# Patient Record
Sex: FEMALE | Race: ASIAN | NOT HISPANIC OR LATINO | URBAN - METROPOLITAN AREA
[De-identification: names, ages, dates, MRNs, and addresses within clinical notes are randomized per-mention and may not be internally consistent; named-entity substitution may affect disease eponyms.]

---

## 2019-09-29 ENCOUNTER — EMERGENCY (EMERGENCY)
Facility: HOSPITAL | Age: 49
LOS: 1 days | Discharge: HOME | End: 2019-09-29
Attending: STUDENT IN AN ORGANIZED HEALTH CARE EDUCATION/TRAINING PROGRAM | Admitting: STUDENT IN AN ORGANIZED HEALTH CARE EDUCATION/TRAINING PROGRAM
Payer: COMMERCIAL

## 2019-09-29 VITALS
DIASTOLIC BLOOD PRESSURE: 94 MMHG | SYSTOLIC BLOOD PRESSURE: 149 MMHG | RESPIRATION RATE: 16 BRPM | TEMPERATURE: 99 F | HEART RATE: 90 BPM | OXYGEN SATURATION: 100 %

## 2019-09-29 PROCEDURE — 99284 EMERGENCY DEPT VISIT MOD MDM: CPT | Mod: 25

## 2019-09-29 PROCEDURE — 93010 ELECTROCARDIOGRAM REPORT: CPT | Mod: 59

## 2019-09-29 NOTE — ED ADULT TRIAGE NOTE - CHIEF COMPLAINT QUOTE
C/o pain to back of neck radiating up to head since yesterday. As per family pt had  for father yesterday and "passed out a few times at the  parlor". Also endorses dizziness. H/o HTN.

## 2019-09-30 VITALS
TEMPERATURE: 98 F | DIASTOLIC BLOOD PRESSURE: 87 MMHG | RESPIRATION RATE: 18 BRPM | HEART RATE: 91 BPM | OXYGEN SATURATION: 100 % | SYSTOLIC BLOOD PRESSURE: 137 MMHG

## 2019-09-30 LAB
ALBUMIN SERPL ELPH-MCNC: 4.1 G/DL — SIGNIFICANT CHANGE UP (ref 3.3–5)
ALP SERPL-CCNC: 67 U/L — SIGNIFICANT CHANGE UP (ref 40–120)
ALT FLD-CCNC: 18 U/L — SIGNIFICANT CHANGE UP (ref 4–33)
ANION GAP SERPL CALC-SCNC: 11 MMO/L — SIGNIFICANT CHANGE UP (ref 7–14)
AST SERPL-CCNC: 17 U/L — SIGNIFICANT CHANGE UP (ref 4–32)
BILIRUB SERPL-MCNC: < 0.2 MG/DL — LOW (ref 0.2–1.2)
BUN SERPL-MCNC: 11 MG/DL — SIGNIFICANT CHANGE UP (ref 7–23)
CALCIUM SERPL-MCNC: 9.1 MG/DL — SIGNIFICANT CHANGE UP (ref 8.4–10.5)
CHLORIDE SERPL-SCNC: 107 MMOL/L — SIGNIFICANT CHANGE UP (ref 98–107)
CO2 SERPL-SCNC: 22 MMOL/L — SIGNIFICANT CHANGE UP (ref 22–31)
CREAT SERPL-MCNC: 0.66 MG/DL — SIGNIFICANT CHANGE UP (ref 0.5–1.3)
GLUCOSE SERPL-MCNC: 104 MG/DL — HIGH (ref 70–99)
HCT VFR BLD CALC: 38.1 % — SIGNIFICANT CHANGE UP (ref 34.5–45)
HGB BLD-MCNC: 12.1 G/DL — SIGNIFICANT CHANGE UP (ref 11.5–15.5)
MAGNESIUM SERPL-MCNC: 2.1 MG/DL — SIGNIFICANT CHANGE UP (ref 1.6–2.6)
MCHC RBC-ENTMCNC: 26.5 PG — LOW (ref 27–34)
MCHC RBC-ENTMCNC: 31.8 % — LOW (ref 32–36)
MCV RBC AUTO: 83.6 FL — SIGNIFICANT CHANGE UP (ref 80–100)
NRBC # FLD: 0 K/UL — SIGNIFICANT CHANGE UP (ref 0–0)
PLATELET # BLD AUTO: 296 K/UL — SIGNIFICANT CHANGE UP (ref 150–400)
PMV BLD: 10 FL — SIGNIFICANT CHANGE UP (ref 7–13)
POTASSIUM SERPL-MCNC: 4.3 MMOL/L — SIGNIFICANT CHANGE UP (ref 3.5–5.3)
POTASSIUM SERPL-SCNC: 4.3 MMOL/L — SIGNIFICANT CHANGE UP (ref 3.5–5.3)
PROT SERPL-MCNC: 7.3 G/DL — SIGNIFICANT CHANGE UP (ref 6–8.3)
RBC # BLD: 4.56 M/UL — SIGNIFICANT CHANGE UP (ref 3.8–5.2)
RBC # FLD: 13.7 % — SIGNIFICANT CHANGE UP (ref 10.3–14.5)
SODIUM SERPL-SCNC: 140 MMOL/L — SIGNIFICANT CHANGE UP (ref 135–145)
WBC # BLD: 7.99 K/UL — SIGNIFICANT CHANGE UP (ref 3.8–10.5)
WBC # FLD AUTO: 7.99 K/UL — SIGNIFICANT CHANGE UP (ref 3.8–10.5)

## 2019-09-30 RX ORDER — LIDOCAINE 4 G/100G
1 CREAM TOPICAL ONCE
Refills: 0 | Status: COMPLETED | OUTPATIENT
Start: 2019-09-30 | End: 2019-09-30

## 2019-09-30 RX ORDER — ACETAMINOPHEN 500 MG
650 TABLET ORAL ONCE
Refills: 0 | Status: COMPLETED | OUTPATIENT
Start: 2019-09-30 | End: 2019-09-30

## 2019-09-30 RX ORDER — KETOROLAC TROMETHAMINE 30 MG/ML
15 SYRINGE (ML) INJECTION ONCE
Refills: 0 | Status: DISCONTINUED | OUTPATIENT
Start: 2019-09-30 | End: 2019-09-30

## 2019-09-30 RX ORDER — DIAZEPAM 5 MG
5 TABLET ORAL ONCE
Refills: 0 | Status: DISCONTINUED | OUTPATIENT
Start: 2019-09-30 | End: 2019-09-30

## 2019-09-30 RX ORDER — SODIUM CHLORIDE 9 MG/ML
1000 INJECTION INTRAMUSCULAR; INTRAVENOUS; SUBCUTANEOUS ONCE
Refills: 0 | Status: COMPLETED | OUTPATIENT
Start: 2019-09-30 | End: 2019-09-30

## 2019-09-30 RX ADMIN — SODIUM CHLORIDE 1000 MILLILITER(S): 9 INJECTION INTRAMUSCULAR; INTRAVENOUS; SUBCUTANEOUS at 01:03

## 2019-09-30 RX ADMIN — Medication 650 MILLIGRAM(S): at 01:03

## 2019-09-30 RX ADMIN — Medication 15 MILLIGRAM(S): at 01:03

## 2019-09-30 RX ADMIN — Medication 5 MILLIGRAM(S): at 01:03

## 2019-09-30 RX ADMIN — LIDOCAINE 1 PATCH: 4 CREAM TOPICAL at 01:03

## 2019-09-30 NOTE — ED PROVIDER NOTE - CLINICAL SUMMARY MEDICAL DECISION MAKING FREE TEXT BOX
50yo p/w neck pain and lightheadedness for 2 days after fathers .  - 1L NS bolus  - CBC/CMP  - Tylenol/Toradol/Lidocaine Patch and Valium

## 2019-09-30 NOTE — ED PROVIDER NOTE - PROGRESS NOTE DETAILS
Luda Wheeler M.D: pt reassessed, feeling better, stable for dc at this time with outpatient follow up.

## 2019-09-30 NOTE — ED PROVIDER NOTE - OBJECTIVE STATEMENT
48yo w/ PMHx of HTN c/o headache/neck pain that started yesterday during her fathers . Observers reported she was dizzy/lightheaded but did not faint. She states her neck pain has since got worse, even after taking advil and Excedrin. She also c/o sensitivity to light. Denies N/V, CP, SOB, Abdominal pain, constipation or diarrhea.

## 2019-09-30 NOTE — ED PROVIDER NOTE - PLAN OF CARE
Get Better with medication and stress relief - f/u outpatient with PCP  - Continue with OTC pain medication

## 2019-09-30 NOTE — ED PROVIDER NOTE - NSFOLLOWUPINSTRUCTIONS_ED_ALL_ED_FT
Patient informed to followup with her primary care physician outpatient for long term care of tension headaches and help assist with the lost of her father. If patient has worsening headaches or neck pain that is not resolved with OTC medications, she can contact her PCP or come to the emergency room if needed.

## 2019-09-30 NOTE — ED PROVIDER NOTE - ATTENDING CONTRIBUTION TO CARE
Luda Wheeler M.D: 49F hx HTN p/w 2 days of headache and posterior neck pain with associated dizziness. symptos started yesterday during  of her father and have been worsening despite advil, excedrin. notes headache is aching/posterior, feels similar to typical headaches. neck pain is diffuse throughout posterior neck, sharp, worse with movement. no numbness/tingling weakness in extremities. also notes dizziness described as lightheadedness, near syncope. no cp no sob no n/v no abd pain no difficulty walking. on exam pt appaers in no distress, moving neck briskly while talking. heart rrr, lungs ctab, abd soft ntnd, cn 2-12 intact gross motor and sensory intact in all extremities, ttp in bl parapsinal cervical region with palpable muscle spasm.   neck pain/headache seems most consistent with MSK pain likely exacerbated by stress of . no neuro findings to suggest cervical cord pathology, vertebral artery pathology. full ROM in neck and no f/c make meningitis unlikely. dizziness/near syncope likely also stress related, ekg without findings to suggest cardiac etiology (no interval prolongation no signs of brugada, hocm, ). will check basic labs to assess for any anemia or lyte abnormality as contributor to symptoms, will treat pain/spasm and reassess.

## 2019-09-30 NOTE — ED PROVIDER NOTE - PHYSICAL EXAMINATION
GEN: NAD; well appearing; A+O x3   HEAD: NC/AT   EYES/NOSE: PERRL & EOMI b/l  THROAT: airway patent, moist mucous membranes  NECK: Neck supple, no masses  PULMONARY: CTA b/l, symmetric breath sounds.   CARDIAC: s1s2, regular rhythm, no Murmur  ABDOMEN:  ND, NT, soft, no guarding, no rebound, no masses   BACK: no CVA tenderness, Normal  spine   SKIN: no rash or bruising   NEUROLOGIC: alert, CN 2-12 intact, . moves all 4 extremities, full active & passive ROM in all extremities,  PSYCH: depressed affect, insight and judgment nl, memory nl,

## 2019-09-30 NOTE — ED PROVIDER NOTE - PATIENT PORTAL LINK FT
You can access the FollowMyHealth Patient Portal offered by [facility] by registering at the following website: [email or web address]. By joining [employer]’s FollowMyHealth portal, you will also be able to view your health information using other applications (apps) compatible with our system.

## 2019-09-30 NOTE — ED PROVIDER NOTE - CARE PLAN
Principal Discharge DX:	Tension headache  Goal:	Get Better with medication and stress relief  Assessment and plan of treatment:	- f/u outpatient with PCP  - Continue with OTC pain medication

## 2019-09-30 NOTE — ED PROVIDER NOTE - NS ED ROS FT
GENERAL: no fever, chills  HEENT: no throat pain, cough, congestion, dysphagia  CARDIAC: no chest pain, palpitations  PULM: no shortness of breath, cough, wheezing   GI: no abdominal pain, nausea, vomiting, diarrhea, constipation  : no dysuria, frequency  NEURO: headache, no lightheadedness. Neck pain with movement  MSK: no joint or muscle pain  SKIN: no rashes  HEME: no active bleeding

## 2019-09-30 NOTE — ED ADULT NURSE NOTE - OBJECTIVE STATEMENT
50 yo F AAOx4 received to room 17 c/o HA/neck pain that started yesterday, denies CP SOB blurry vision N V, was at a  yesterday and had a syncopal episode, no signs of trauma/injuries, appears in NADKIARA MD at bedside, will monitor

## 2020-04-09 NOTE — ED ADULT NURSE NOTE - CAS EDN DISCHARGE ASSESSMENT
Clinic Care Coordination Contact  [facility]/Voicemail       Clinical Data: Care Coordinator Outreach  Outreach attempted x 2.  Left message on patient's voicemail with call back information and requested return call.  Plan: Care Coordinator will try to reach patient again in approximately 10 business days.    Melissa Behl BSN, RN, PHN, [facility]  Primary Care Clinical RN Care Coordinator  [facility]   [phone]       
Alert and oriented to person, place and time

## 2021-05-06 ENCOUNTER — EMERGENCY (EMERGENCY)
Facility: HOSPITAL | Age: 51
LOS: 1 days | Discharge: HOME | End: 2021-05-06
Attending: EMERGENCY MEDICINE | Admitting: EMERGENCY MEDICINE
Payer: COMMERCIAL

## 2021-05-06 VITALS
RESPIRATION RATE: 20 BRPM | TEMPERATURE: 99 F | DIASTOLIC BLOOD PRESSURE: 90 MMHG | HEART RATE: 114 BPM | SYSTOLIC BLOOD PRESSURE: 148 MMHG | OXYGEN SATURATION: 100 %

## 2021-05-06 VITALS — HEART RATE: 96 BPM

## 2021-05-06 PROBLEM — I10 ESSENTIAL (PRIMARY) HYPERTENSION: Chronic | Status: ACTIVE | Noted: 2019-09-30

## 2021-05-06 LAB — SARS-COV-2 RNA SPEC QL NAA+PROBE: SIGNIFICANT CHANGE UP

## 2021-05-06 PROCEDURE — 99284 EMERGENCY DEPT VISIT MOD MDM: CPT

## 2021-05-06 PROCEDURE — 71045 X-RAY EXAM CHEST 1 VIEW: CPT | Mod: 26

## 2021-05-06 RX ORDER — IBUPROFEN 200 MG
1 TABLET ORAL
Qty: 28 | Refills: 0
Start: 2021-05-06 | End: 2021-05-12

## 2021-05-06 RX ORDER — IBUPROFEN 200 MG
600 TABLET ORAL ONCE
Refills: 0 | Status: COMPLETED | OUTPATIENT
Start: 2021-05-06 | End: 2021-05-06

## 2021-05-06 RX ORDER — ACETAMINOPHEN 500 MG
650 TABLET ORAL ONCE
Refills: 0 | Status: COMPLETED | OUTPATIENT
Start: 2021-05-06 | End: 2021-05-06

## 2021-05-06 RX ORDER — ACETAMINOPHEN 500 MG
1 TABLET ORAL
Qty: 28 | Refills: 0
Start: 2021-05-06 | End: 2021-05-12

## 2021-05-06 RX ADMIN — Medication 600 MILLIGRAM(S): at 12:16

## 2021-05-06 RX ADMIN — Medication 650 MILLIGRAM(S): at 12:16

## 2021-05-06 NOTE — ED PROVIDER NOTE - NSFOLLOWUPINSTRUCTIONS_ED_ALL_ED_FT
Follow up with your PMD within 48-72 hours.  Rest, increase fluids. Take tylenol 650mg every 6 hours for pain or temp greater than 99.9. Take Motrin 600 mg every 8 hours as needed for moderate pain -- take with food. Take Mucinex as needed for congestion. Worsening or continued fever, chills, weakness, nausea, vomiting, abdominal pain or new concerning symptoms return to Emergency Department.

## 2021-05-06 NOTE — ED PROVIDER NOTE - CLINICAL SUMMARY MEDICAL DECISION MAKING FREE TEXT BOX
52 y/o F with PMHx of HTN presents to ED c/o fever, cough, and congestion for 2 days.  Recent travel from [place] with URI symptoms. COVID vs other viral syndrome. Will obtain COVID swab, CXR, give Ibuprofen. D/c with PCP f/u. 52 y/o F with PMHx of HTN presents to ED c/o fever, cough, and congestion for 2 days.  Recent travel from [place] with URI symptoms. COVID vs other viral syndrome, less likely bact pna. Will obtain COVID swab, CXR, give Ibuprofen, acetaminophen. D/c with PCP f/u.

## 2021-05-06 NOTE — ED ADULT TRIAGE NOTE - CHIEF COMPLAINT QUOTE
Pt c/o cough, fever, congestion, SOB since yesterday. States she returned from [place] 4 days ago. Hx: HTN. Respirations even/unlabored at this time

## 2021-05-06 NOTE — ED PROVIDER NOTE - PATIENT PORTAL LINK FT
You can access the FollowMyHealth Patient Portal offered by [facility] by registering at the following website: [email or web address]. By joining [employer]’s FollowMyHealth portal, you will also be able to view your health information using other applications (apps) compatible with our system.

## 2021-05-06 NOTE — ED ADULT TRIAGE NOTE - DOMESTIC TRAVEL HIGH RISK QUESTION
POSTPARTUM FOLLOW UP CALL    Date of delivery: 12.5.2020 , Magy fournier , Dr. Thaddeus Wolf     Are you breastfeeding? No    What type of delivery did you have? Vaginal - Patient is experiencing the following symptoms:  No concerns expressed    Are you having any pain or discomfort? No    Are you getting enough support at home? yes    How much rest are you getting? Doing ok. Were you gestational diabetic during your pregnancy? No    Have you experienced any symptoms of postpartum depression? -- Feeling anxious or worried -No - patient educated on signs/symptoms of postpartum depression and instructed to call if symptoms continue for more than 2 weeks. Patient denies any feelings of harming herself or her infant. -- Feeling sad -No - patient educated on signs/symptoms of postpartum depression and instructed to call if symptoms continue for more than 2 weeks. Patient denies any feelings of harming herself or her infant. -- Feeling isolated -No - patient educated on signs/symptoms of postpartum depression and instructed to call if symptoms continue for more than 2 weeks. Patient denies any feelings of harming herself or her infant. Patient was instructed to:   -- avoid sexual intercourse for 6 weeks after delivery. -- avoid lifting anything heavier than the baby for at least the first 6 weeks after delivery. -- call the office if:       vaginal discharge increases or requires changing sanitary pads more than once an hour. fever or chills. dizziness. nausea or vomiting. shortness of breath.       pain and burning during urination. painful red breasts. Have you scheduled your postpartum checkup? no - appointment time booked     Patient transferred to ob reception for appt. Education reviewed about bleeding, sex, urinary problems, lifting, limitations, breast issues, fever, chills, shortness of breath. Patient understands to call our office if any occur. Yes

## 2022-07-05 NOTE — ED PROVIDER NOTE - CROS ED ENMT ALL NEG
Call to [employer]. She states letter was faxed to the wrong department. It is not a PA.   It needs to go to Claims Appeal.   [phone]    refaxed the letter.       
Patient calling regarding her vitamin D test and the letter written by Chantale at office visit on 5/2/22. She stated the insurance company did not get the letter that was written at office visit.  See letter from 6/3/22.    [facility].  Call [phone]    Please call insurance company next week due to time constraints could not do it today.   
- - -

## 2023-04-28 ENCOUNTER — EMERGENCY (EMERGENCY)
Facility: HOSPITAL | Age: 53
LOS: 1 days | Discharge: HOME | End: 2023-04-28
Attending: STUDENT IN AN ORGANIZED HEALTH CARE EDUCATION/TRAINING PROGRAM | Admitting: STUDENT IN AN ORGANIZED HEALTH CARE EDUCATION/TRAINING PROGRAM
Payer: COMMERCIAL

## 2023-04-28 VITALS
SYSTOLIC BLOOD PRESSURE: 164 MMHG | HEART RATE: 122 BPM | TEMPERATURE: 100 F | RESPIRATION RATE: 16 BRPM | OXYGEN SATURATION: 98 % | DIASTOLIC BLOOD PRESSURE: 100 MMHG

## 2023-04-28 PROCEDURE — 99285 EMERGENCY DEPT VISIT HI MDM: CPT

## 2023-04-28 PROCEDURE — 93010 ELECTROCARDIOGRAM REPORT: CPT

## 2023-04-29 VITALS — HEART RATE: 104 BPM

## 2023-04-29 LAB
ALBUMIN SERPL ELPH-MCNC: 3.7 G/DL — SIGNIFICANT CHANGE UP (ref 3.3–5)
ALP SERPL-CCNC: 105 U/L — SIGNIFICANT CHANGE UP (ref 40–120)
ALT FLD-CCNC: 36 U/L — HIGH (ref 4–33)
ANION GAP SERPL CALC-SCNC: 13 MMOL/L — SIGNIFICANT CHANGE UP (ref 7–14)
AST SERPL-CCNC: 47 U/L — HIGH (ref 4–32)
B PERT DNA SPEC QL NAA+PROBE: SIGNIFICANT CHANGE UP
B PERT+PARAPERT DNA PNL SPEC NAA+PROBE: SIGNIFICANT CHANGE UP
BASOPHILS # BLD AUTO: 0.04 K/UL — SIGNIFICANT CHANGE UP (ref 0–0.2)
BASOPHILS NFR BLD AUTO: 0.5 % — SIGNIFICANT CHANGE UP (ref 0–2)
BILIRUB SERPL-MCNC: <0.2 MG/DL — SIGNIFICANT CHANGE UP (ref 0.2–1.2)
BORDETELLA PARAPERTUSSIS (RAPRVP): SIGNIFICANT CHANGE UP
BUN SERPL-MCNC: 13 MG/DL — SIGNIFICANT CHANGE UP (ref 7–23)
C PNEUM DNA SPEC QL NAA+PROBE: SIGNIFICANT CHANGE UP
CALCIUM SERPL-MCNC: 8.5 MG/DL — SIGNIFICANT CHANGE UP (ref 8.4–10.5)
CHLORIDE SERPL-SCNC: 105 MMOL/L — SIGNIFICANT CHANGE UP (ref 98–107)
CO2 SERPL-SCNC: 21 MMOL/L — LOW (ref 22–31)
CREAT SERPL-MCNC: 0.67 MG/DL — SIGNIFICANT CHANGE UP (ref 0.5–1.3)
EGFR: 105 ML/MIN/1.73M2 — SIGNIFICANT CHANGE UP
EOSINOPHIL # BLD AUTO: 0.04 K/UL — SIGNIFICANT CHANGE UP (ref 0–0.5)
EOSINOPHIL NFR BLD AUTO: 0.5 % — SIGNIFICANT CHANGE UP (ref 0–6)
FLUAV SUBTYP SPEC NAA+PROBE: SIGNIFICANT CHANGE UP
FLUBV RNA SPEC QL NAA+PROBE: SIGNIFICANT CHANGE UP
GLUCOSE SERPL-MCNC: 113 MG/DL — HIGH (ref 70–99)
HADV DNA SPEC QL NAA+PROBE: SIGNIFICANT CHANGE UP
HCOV 229E RNA SPEC QL NAA+PROBE: SIGNIFICANT CHANGE UP
HCOV HKU1 RNA SPEC QL NAA+PROBE: SIGNIFICANT CHANGE UP
HCOV NL63 RNA SPEC QL NAA+PROBE: SIGNIFICANT CHANGE UP
HCOV OC43 RNA SPEC QL NAA+PROBE: SIGNIFICANT CHANGE UP
HCT VFR BLD CALC: 33.5 % — LOW (ref 34.5–45)
HGB BLD-MCNC: 10.5 G/DL — LOW (ref 11.5–15.5)
HMPV RNA SPEC QL NAA+PROBE: DETECTED
HPIV1 RNA SPEC QL NAA+PROBE: SIGNIFICANT CHANGE UP
HPIV2 RNA SPEC QL NAA+PROBE: SIGNIFICANT CHANGE UP
HPIV3 RNA SPEC QL NAA+PROBE: SIGNIFICANT CHANGE UP
HPIV4 RNA SPEC QL NAA+PROBE: SIGNIFICANT CHANGE UP
IANC: 5.72 K/UL — SIGNIFICANT CHANGE UP (ref 1.8–7.4)
IMM GRANULOCYTES NFR BLD AUTO: 0.2 % — SIGNIFICANT CHANGE UP (ref 0–0.9)
LYMPHOCYTES # BLD AUTO: 1.49 K/UL — SIGNIFICANT CHANGE UP (ref 1–3.3)
LYMPHOCYTES # BLD AUTO: 17.9 % — SIGNIFICANT CHANGE UP (ref 13–44)
M PNEUMO DNA SPEC QL NAA+PROBE: SIGNIFICANT CHANGE UP
MCHC RBC-ENTMCNC: 25.4 PG — LOW (ref 27–34)
MCHC RBC-ENTMCNC: 31.3 GM/DL — LOW (ref 32–36)
MCV RBC AUTO: 81.1 FL — SIGNIFICANT CHANGE UP (ref 80–100)
MONOCYTES # BLD AUTO: 1.02 K/UL — HIGH (ref 0–0.9)
MONOCYTES NFR BLD AUTO: 12.2 % — SIGNIFICANT CHANGE UP (ref 2–14)
NEUTROPHILS # BLD AUTO: 5.72 K/UL — SIGNIFICANT CHANGE UP (ref 1.8–7.4)
NEUTROPHILS NFR BLD AUTO: 68.7 % — SIGNIFICANT CHANGE UP (ref 43–77)
NRBC # BLD: 0 /100 WBCS — SIGNIFICANT CHANGE UP (ref 0–0)
NRBC # FLD: 0 K/UL — SIGNIFICANT CHANGE UP (ref 0–0)
PLATELET # BLD AUTO: 259 K/UL — SIGNIFICANT CHANGE UP (ref 150–400)
POTASSIUM SERPL-MCNC: 4.3 MMOL/L — SIGNIFICANT CHANGE UP (ref 3.5–5.3)
POTASSIUM SERPL-SCNC: 4.3 MMOL/L — SIGNIFICANT CHANGE UP (ref 3.5–5.3)
PROT SERPL-MCNC: 6.8 G/DL — SIGNIFICANT CHANGE UP (ref 6–8.3)
RAPID RVP RESULT: DETECTED
RBC # BLD: 4.13 M/UL — SIGNIFICANT CHANGE UP (ref 3.8–5.2)
RBC # FLD: 14.3 % — SIGNIFICANT CHANGE UP (ref 10.3–14.5)
RSV RNA SPEC QL NAA+PROBE: SIGNIFICANT CHANGE UP
RV+EV RNA SPEC QL NAA+PROBE: SIGNIFICANT CHANGE UP
SARS-COV-2 RNA SPEC QL NAA+PROBE: SIGNIFICANT CHANGE UP
SODIUM SERPL-SCNC: 139 MMOL/L — SIGNIFICANT CHANGE UP (ref 135–145)
TROPONIN T, HIGH SENSITIVITY RESULT: <6 NG/L — SIGNIFICANT CHANGE UP
WBC # BLD: 8.33 K/UL — SIGNIFICANT CHANGE UP (ref 3.8–10.5)
WBC # FLD AUTO: 8.33 K/UL — SIGNIFICANT CHANGE UP (ref 3.8–10.5)

## 2023-04-29 PROCEDURE — 71046 X-RAY EXAM CHEST 2 VIEWS: CPT | Mod: 26

## 2023-04-29 RX ORDER — KETOROLAC TROMETHAMINE 30 MG/ML
15 SYRINGE (ML) INJECTION ONCE
Refills: 0 | Status: DISCONTINUED | OUTPATIENT
Start: 2023-04-29 | End: 2023-04-29

## 2023-04-29 RX ORDER — ACETAMINOPHEN 500 MG
975 TABLET ORAL ONCE
Refills: 0 | Status: COMPLETED | OUTPATIENT
Start: 2023-04-29 | End: 2023-04-29

## 2023-04-29 RX ORDER — SODIUM CHLORIDE 9 MG/ML
1000 INJECTION INTRAMUSCULAR; INTRAVENOUS; SUBCUTANEOUS ONCE
Refills: 0 | Status: COMPLETED | OUTPATIENT
Start: 2023-04-29 | End: 2023-04-29

## 2023-04-29 RX ADMIN — Medication 15 MILLIGRAM(S): at 01:56

## 2023-04-29 RX ADMIN — Medication 15 MILLIGRAM(S): at 02:37

## 2023-04-29 RX ADMIN — SODIUM CHLORIDE 1000 MILLILITER(S): 9 INJECTION INTRAMUSCULAR; INTRAVENOUS; SUBCUTANEOUS at 01:56

## 2023-04-29 RX ADMIN — Medication 975 MILLIGRAM(S): at 01:56

## 2023-04-29 RX ADMIN — Medication 975 MILLIGRAM(S): at 02:37

## 2023-04-29 NOTE — ED PROVIDER NOTE - PROGRESS NOTE DETAILS
Mari Washington MD, PGY-3: Improved symptoms of chest pain, improved vital signs, heart rate now 104, prior had been 120.  Explained test results to patient, DC ready.

## 2023-04-29 NOTE — ED PROVIDER NOTE - NSFOLLOWUPINSTRUCTIONS_ED_ALL_ED_FT
--take tylenol or motrin as needed for pain. Take tylenol 1000mg every 6 hours alternating with motrin 600 mg every 6 hours (take tylenol or motrin then three hours later take the other then three hours later take the first).  --today, the lab tests we did include basic blood tests, the imaging tests we did include chest xray. results significant for no bacterial pneumonia, normal cardiac enzymes, no signs of bacterial infection on labs. we have included these test results in your paperwork. please take them to your follow-up appointment  --given that you were in the ED today, we recommend a followup visit with your general doctor (primary care doctor) within 7 days.   --your diagnosis is: viral syndrome, costochondritis.   --return to the ED if Symptoms change in type or nature, if worsening shortness of breath, if passing out

## 2023-04-29 NOTE — ED ADULT NURSE NOTE - ISOLATION TYPE:
-- DO NOT REPLY / DO NOT REPLY ALL --  -- Message is from the [facility]--    COVID-19 Universal Screening: Negative    General Patient Message      Reason for Call: The patient mother is returning a call to Dr. Roque    Caller Information       Type Contact Phone    2020 12:12 PM Phone (Incoming) Anabella Moulton (Mother) [phone] (M)          Alternative phone number: n/a    Turnaround time given to caller:   \"This message will be sent to [state Provider's name]. The clinical team will fulfill your request as soon as they review your message.\"    
Spoke with mom, reschedule pt appt to see Dr Roque on a different day.  
None

## 2023-04-29 NOTE — ED PROVIDER NOTE - PATIENT PORTAL LINK FT
You can access the FollowMyHealth Patient Portal offered by [facility] by registering at the following website: [email or web address]. By joining [employer]’s FollowMyHealth portal, you will also be able to view your health information using other applications (apps) compatible with our system.

## 2023-04-29 NOTE — ED PROVIDER NOTE - CHIEF COMPLAINT
The patient is a 53y Female complaining of weakness. Moderate chronic risk given medication non-compliance

## 2023-04-29 NOTE — ED PROVIDER NOTE - ATTENDING CONTRIBUTION TO CARE
I have personally performed a face to face medical and diagnostic evaluation of the patient. I have discussed with and reviewed the Resident's note and agree with the History, ROS, Physical Exam and MDM unless otherwise indicated. A brief summary of my personal evaluation and impression can be found below.    53-year-old female with past medical history of hypertension, hyperlipidemia presenting with chief complaint of generalized body aches, sore throat, bilateral chest pain that started about 1 day ago.  No known sick contacts.  Denies any cough,  shortness of breath, abdominal pain, diaphoresis or nausea or vomiting.  Symptoms consistent with possible viral syndrome.  On exam, patient tachycardic and low-grade temp.  Lungs clear to auscultation bilaterally with severe tenderness to palpation of the anterior chest wall bilaterally.  Low suspicion for pneumonia, but given chest pain and infectious symptoms, will obtain x-ray to assess for focal consolidation, more likely viral illness.  Low suspicion for ACS causing the chest pain given description and exam.  But given risk factors will obtain labs to assess for  cardiac cause.  EKG without any ischemic changes, sinus tach.  Symptoms likely secondary from costochondritis.  Trial Toradol, Tylenol, fluids, reassess to dispo. Ilia Upton, ED Attending

## 2023-04-29 NOTE — ED PROVIDER NOTE - PHYSICAL EXAMINATION
Gen: WDWN, NAD  HEENT: EOMI, no nasal discharge, mucous membranes moist. + oropharynx with erythema, no exudates.   CV: fast rate, regular rhythm, 2+ radial pulses b/l  Chest: + reproducible chest wall ttp L chest wall  Resp: CTAB, no W/R/R, no accessory muscle use, no increased work of breathing  GI: Abdomen soft non-distended, NTTP  MSK: No open wounds, no bruising, no LE edema  Neuro: A&Ox4, following commands, moving all four extremities spontaneously  Psych: appropriate mood

## 2023-04-29 NOTE — ED ADULT NURSE NOTE - OBJECTIVE STATEMENT
Patient A&OX4 present to Ed with c/o elevated B/P took meds PTA, temperature of 102.5 at home starting today and chest pain on pain scale 8/10 sharp in sensation. Patient denies taking anything at home for fever , recently traveled to [place] came back monday night

## 2023-04-29 NOTE — ED ADULT NURSE REASSESSMENT NOTE - NS ED NURSE REASSESS COMMENT FT1
Break Coverage RN: Pt A&OX4, respirations equal and unlabored. Pt medicated as per EMR orders. Safety maintained. Plan to dispo home. IV removed. No acute distress noted.

## 2023-04-29 NOTE — ED PROVIDER NOTE - CLINICAL SUMMARY MEDICAL DECISION MAKING FREE TEXT BOX
Mari Washington MD, PGY-3: 52YO hx HTN, HLD, p/w L sided CP, cough, sore throat. vs: tachycardic, normotensive. suspect viral illness, also consider bacterial pna. given age and risk factors, will screen for acs w/ basic labs, trop, cxr.

## 2023-04-29 NOTE — ED PROVIDER NOTE - OBJECTIVE STATEMENT
52YO hx HTN, HLD, p/w L sided CP. worse with palpation. a/w dry cough, headache, fevers, sore throat. + sick contact of grandson. CP is intermittent. denies hx of cardiac disease. despite triage note, pt denies ear pain, hearing loss. also denies abdominal pain, n/v.

## 2023-09-30 ENCOUNTER — EMERGENCY (EMERGENCY)
Facility: HOSPITAL | Age: 53
LOS: 1 days | Discharge: HOME | End: 2023-09-30
Attending: STUDENT IN AN ORGANIZED HEALTH CARE EDUCATION/TRAINING PROGRAM | Admitting: STUDENT IN AN ORGANIZED HEALTH CARE EDUCATION/TRAINING PROGRAM
Payer: COMMERCIAL

## 2023-09-30 VITALS
SYSTOLIC BLOOD PRESSURE: 157 MMHG | RESPIRATION RATE: 17 BRPM | TEMPERATURE: 101 F | WEIGHT: 139.99 LBS | HEART RATE: 131 BPM | OXYGEN SATURATION: 100 % | DIASTOLIC BLOOD PRESSURE: 97 MMHG

## 2023-09-30 PROCEDURE — 99285 EMERGENCY DEPT VISIT HI MDM: CPT

## 2023-09-30 PROCEDURE — 93010 ELECTROCARDIOGRAM REPORT: CPT

## 2023-09-30 RX ORDER — ACETAMINOPHEN 500 MG
1000 TABLET ORAL ONCE
Refills: 0 | Status: COMPLETED | OUTPATIENT
Start: 2023-09-30 | End: 2023-09-30

## 2023-09-30 RX ORDER — SODIUM CHLORIDE 9 MG/ML
1000 INJECTION INTRAMUSCULAR; INTRAVENOUS; SUBCUTANEOUS ONCE
Refills: 0 | Status: COMPLETED | OUTPATIENT
Start: 2023-09-30 | End: 2023-09-30

## 2023-09-30 RX ADMIN — SODIUM CHLORIDE 1000 MILLILITER(S): 9 INJECTION INTRAMUSCULAR; INTRAVENOUS; SUBCUTANEOUS at 23:52

## 2023-09-30 RX ADMIN — Medication 400 MILLIGRAM(S): at 23:52

## 2023-09-30 NOTE — ED PROVIDER NOTE - PROGRESS NOTE DETAILS
Tess Lovelace MD PGY-2: patient feeling better, VS Improved s/p IVF, ofirmev. COVID positive on RVP. CXR clear, UA neg. Patient requesting Paxlovid. Will send Rx to preferred pharmacy in addition to zofran for n/v as needed. advised to hold atorvastatin during paxlovid course. return precautions discussed and patient dc home with  in good condition.

## 2023-09-30 NOTE — ED ADULT NURSE NOTE - NSFALLUNIVINTERV_ED_ALL_ED
Bed/Stretcher in lowest position, wheels locked, appropriate side rails in place/Call bell, personal items and telephone in reach/Instruct patient to call for assistance before getting out of bed/chair/stretcher/Non-slip footwear applied when patient is off stretcher/[place] to call system/Physically safe environment - no spills, clutter or unnecessary equipment/Purposeful proactive rounding/Room/bathroom lighting operational, light cord in reach

## 2023-09-30 NOTE — ED ADULT TRIAGE NOTE - CHIEF COMPLAINT QUOTE
Pt arrives to ED, seen at [facility] earlier today for HA which began at 04:00 and was told she has a sinus infection.  Pt c/o N/V with abd pain and unable to keep down the meds she was prescribed.  Pt is tachy in 130's in triage with fever of 100.7F.

## 2023-09-30 NOTE — ED PROVIDER NOTE - CLINICAL SUMMARY MEDICAL DECISION MAKING FREE TEXT BOX
53Y F PMH HTN, HLD presenting with n/v, body aches, headache x1 day. Tachy to 130s, febrile to 100.9F oral on arrival. Lungs clear. Appears ill. Suspect viral illness given known COVID exposure. will initiate infectious work up - labs, BCx, RVP, UA/UC, CXR, EKG, IVF bolus, antipyretics, reassess

## 2023-09-30 NOTE — ED ADULT NURSE NOTE - OBJECTIVE STATEMENT
Break RN: A&OX4, awake, and alert, ambulatory, Patient is coming to ED in regards to fever, n/v/d, and ABD pain. Patient states  symptoms started today around 4pm, pain on ABD is generalized. Patient states she was given amoxicillin and was told she had a sinus infection, has not been able to hold down any of her home medications. patient endorses home fevers of 100.7. h/o HTN, HLD. Sinus tach on tele, RA. awaiting orders, will continue to monitor.

## 2023-09-30 NOTE — ED PROVIDER NOTE - NSICDXPASTMEDICALHX_GEN_ALL_CORE_FT
PAST MEDICAL HISTORY:  Essential hypertension      PAST MEDICAL HISTORY:  Essential hypertension     Hyperlipidemia

## 2023-09-30 NOTE — ED ADULT TRIAGE NOTE - TEMPERATURE IN CELSIUS (DEGREES C)
Ongoing SW/CM Assessment/Plan of Care Note     Discharge Disposition: Home   Discharge Destination: (Not going to other HC Provider)  Discharge Location: Home [place]   Services: None   Pharmacy: [facility]   Baseline Level of Care: Independent   Transportation: Self/Spouse  PCP: Lisa Elizabeth MD   DME: None   Advanced Directives:  Not on file  COVID Test: COVID+ 9/13  IMM/MOON: NA  Important Phone Numbers: Hilda, spouse at [phone]    See SW/CM flowsheets for goals and other objective data.    Patient/Family discharge goal (s):  Goal #1: Home discharge arranged  Goal #2: Transportation arranged or issues addressed  Goal #3: Transportation arranged or issues addressed    PT Recommendation:          OT Recommendation:          SLP Recommendation:       Disposition:  Planned Discharge Destination: Home/apartment with Spouse/SO    Progress note:   1000- Collaborated with care team for OFT Rounds to review and discuss dc plan. Patient on day seven of Remdesivir and IV Decadron. Patient is proning well. Patient on 30L @ 70%. No therapy needs.     1048 - Call placed to patient to review and discuss dc plans. Patient sounded to be alert and oriented. Patient reports feeling better every day. He reports dc plan remains the same and he will return home with no needs, except pending O2 needs.   CM to continue to follow for dc needs for patient to return home with spouse. DESEAN TAVERA         38.2

## 2023-09-30 NOTE — ED PROVIDER NOTE - NSFOLLOWUPINSTRUCTIONS_ED_ALL_ED_FT
You were seen in the ER today for body aches and fever.    All of your blood work today was within normal limits.  You tested positive for the COVID virus today.    I sent a prescription for the antiviral medication called Paxlovid to your pharmacy.  You should take this 2 times per day for 5 days to help with your viral symptoms.  There is an interaction with your cholesterol medication atorvastatin and the Paxlovid.  You should not take your atorvastatin while you are on the Paxlovid and for 2 days after you stop taking the Paxlovid.    Follow-up with your primary doctor in the next 1 week to be reevaluated.    If you develop any new or worsening symptoms including chest pain, difficulty breathing, uncontrolled nausea or vomiting, or you are otherwise concerned, please come back to the ER right away to be reevaluated.

## 2023-09-30 NOTE — ED PROVIDER NOTE - PATIENT PORTAL LINK FT
You can access the FollowMyHealth Patient Portal offered by [facility] by registering at the following website: [email or web address]. By joining [employer]’s FollowMyHealth portal, you will also be able to view your health information using other applications (apps) compatible with our system.

## 2023-09-30 NOTE — ED PROVIDER NOTE - ATTENDING CONTRIBUTION TO CARE
Milind Ocasio DO:  patient seen and evaluated with the resident.  I was present for key portions of the History & Physical, and I agree with the Impression & Plan. 53 YOF, PMH HTN, HLD, PW fever.  Patient ports 1 day of symptoms, started this morning at 0400, has fever/chills.  Also has malaise, dry cough, headache, nausea, sinus pressure.  Denies recent travel.  Reports she is has positive sick contact, patient she works or has COVID.  Mother bedside provides collateral.  Patient arrives HDS, well-appearing, neurovascular intact.  Suspect COVID or viral syndrome.  Do not suspect  bacterial sinus infection, cavernous sinus thrombosis.  Do not SPECT intracranial infection.  Do not SPECT meningitis.  Labs, imaging, symptomatic control, reassess. Milind Ocasio DO:  patient seen and evaluated with the resident.  I was present for key portions of the History & Physical, and I agree with the Impression & Plan. 53 YOF, PMH HTN, HLD, PW fever.  Patient ports 1 day of symptoms, started this morning at 0400, has fever/chills.  Also has malaise, dry cough, headache, nausea, sinus pressure.  Denies recent travel.  Reports she is has positive sick contact, patient she works or has COVID.  Mother bedside provides collateral.  Patient arrives HDS, well-appearing, neurovascular intact.  Suspect COVID or viral syndrome.  Do not suspect  bacterial sinus infection, cavernous sinus thrombosis.  Do not SPECT intracranial infection.  Do not SPECT meningitis.  Labs, imaging, symptomatic control, reassess..

## 2023-09-30 NOTE — ED ADULT NURSE NOTE - CHIEF COMPLAINT QUOTE
Pt arrives to ED, seen at [facility] earlier today for HA which began at 04:00 and was told she has a sinus infection.  Pt c/o N/V with abd pain and unable to keep down the meds she was prescribed.  Pt is tachy in 130's in triage with fever of 100.7F.

## 2023-09-30 NOTE — ED PROVIDER NOTE - OBJECTIVE STATEMENT
53Y F PMH HTN presenting with fever, nausea/vomiting, and body aches x1 day. 53Y F PMH HTN presenting with fever, nausea/vomiting, headache and body aches x1 day. Unable to tolerate PO throughout day today. Went to  prior to arrival and was prescribed Augmentin for presumed sinusitis after negative rapid COVID test. States she took first dose but immediately vomited so presents to ED for further evaluation. 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 Vaccinated x3. Denies chest pain, SOB.

## 2023-10-01 VITALS
TEMPERATURE: 99 F | RESPIRATION RATE: 18 BRPM | DIASTOLIC BLOOD PRESSURE: 78 MMHG | HEART RATE: 101 BPM | SYSTOLIC BLOOD PRESSURE: 123 MMHG | OXYGEN SATURATION: 100 %

## 2023-10-01 LAB
ALBUMIN SERPL ELPH-MCNC: 4.6 G/DL — SIGNIFICANT CHANGE UP (ref 3.3–5)
ALP SERPL-CCNC: 145 U/L — HIGH (ref 40–120)
ALT FLD-CCNC: 73 U/L — HIGH (ref 4–33)
ANION GAP SERPL CALC-SCNC: 15 MMOL/L — HIGH (ref 7–14)
APPEARANCE UR: CLEAR — SIGNIFICANT CHANGE UP
AST SERPL-CCNC: 24 U/L — SIGNIFICANT CHANGE UP (ref 4–32)
B PERT DNA SPEC QL NAA+PROBE: SIGNIFICANT CHANGE UP
B PERT+PARAPERT DNA PNL SPEC NAA+PROBE: SIGNIFICANT CHANGE UP
BACTERIA # UR AUTO: NEGATIVE /HPF — SIGNIFICANT CHANGE UP
BASOPHILS # BLD AUTO: 0.05 K/UL — SIGNIFICANT CHANGE UP (ref 0–0.2)
BASOPHILS NFR BLD AUTO: 0.5 % — SIGNIFICANT CHANGE UP (ref 0–2)
BILIRUB SERPL-MCNC: 0.3 MG/DL — SIGNIFICANT CHANGE UP (ref 0.2–1.2)
BILIRUB UR-MCNC: NEGATIVE — SIGNIFICANT CHANGE UP
BLOOD GAS VENOUS COMPREHENSIVE RESULT: SIGNIFICANT CHANGE UP
BORDETELLA PARAPERTUSSIS (RAPRVP): SIGNIFICANT CHANGE UP
BUN SERPL-MCNC: 11 MG/DL — SIGNIFICANT CHANGE UP (ref 7–23)
C PNEUM DNA SPEC QL NAA+PROBE: SIGNIFICANT CHANGE UP
CALCIUM SERPL-MCNC: 9.5 MG/DL — SIGNIFICANT CHANGE UP (ref 8.4–10.5)
CAST: 1 /LPF — SIGNIFICANT CHANGE UP (ref 0–4)
CHLORIDE SERPL-SCNC: 96 MMOL/L — LOW (ref 98–107)
CO2 SERPL-SCNC: 25 MMOL/L — SIGNIFICANT CHANGE UP (ref 22–31)
COLOR SPEC: YELLOW — SIGNIFICANT CHANGE UP
CREAT SERPL-MCNC: 0.7 MG/DL — SIGNIFICANT CHANGE UP (ref 0.5–1.3)
DIFF PNL FLD: ABNORMAL
EGFR: 104 ML/MIN/1.73M2 — SIGNIFICANT CHANGE UP
EOSINOPHIL # BLD AUTO: 0.01 K/UL — SIGNIFICANT CHANGE UP (ref 0–0.5)
EOSINOPHIL NFR BLD AUTO: 0.1 % — SIGNIFICANT CHANGE UP (ref 0–6)
FLUAV SUBTYP SPEC NAA+PROBE: SIGNIFICANT CHANGE UP
FLUBV RNA SPEC QL NAA+PROBE: SIGNIFICANT CHANGE UP
GLUCOSE SERPL-MCNC: 119 MG/DL — HIGH (ref 70–99)
GLUCOSE UR QL: NEGATIVE MG/DL — SIGNIFICANT CHANGE UP
HADV DNA SPEC QL NAA+PROBE: SIGNIFICANT CHANGE UP
HCOV 229E RNA SPEC QL NAA+PROBE: SIGNIFICANT CHANGE UP
HCOV HKU1 RNA SPEC QL NAA+PROBE: SIGNIFICANT CHANGE UP
HCOV NL63 RNA SPEC QL NAA+PROBE: SIGNIFICANT CHANGE UP
HCOV OC43 RNA SPEC QL NAA+PROBE: SIGNIFICANT CHANGE UP
HCT VFR BLD CALC: 40.4 % — SIGNIFICANT CHANGE UP (ref 34.5–45)
HGB BLD-MCNC: 13 G/DL — SIGNIFICANT CHANGE UP (ref 11.5–15.5)
HMPV RNA SPEC QL NAA+PROBE: SIGNIFICANT CHANGE UP
HPIV1 RNA SPEC QL NAA+PROBE: SIGNIFICANT CHANGE UP
HPIV2 RNA SPEC QL NAA+PROBE: SIGNIFICANT CHANGE UP
HPIV3 RNA SPEC QL NAA+PROBE: SIGNIFICANT CHANGE UP
HPIV4 RNA SPEC QL NAA+PROBE: SIGNIFICANT CHANGE UP
IANC: 7.58 K/UL — HIGH (ref 1.8–7.4)
IMM GRANULOCYTES NFR BLD AUTO: 0.2 % — SIGNIFICANT CHANGE UP (ref 0–0.9)
KETONES UR-MCNC: NEGATIVE MG/DL — SIGNIFICANT CHANGE UP
LEUKOCYTE ESTERASE UR-ACNC: NEGATIVE — SIGNIFICANT CHANGE UP
LIDOCAIN IGE QN: 25 U/L — SIGNIFICANT CHANGE UP (ref 7–60)
LYMPHOCYTES # BLD AUTO: 0.83 K/UL — LOW (ref 1–3.3)
LYMPHOCYTES # BLD AUTO: 8.7 % — LOW (ref 13–44)
M PNEUMO DNA SPEC QL NAA+PROBE: SIGNIFICANT CHANGE UP
MCHC RBC-ENTMCNC: 25.7 PG — LOW (ref 27–34)
MCHC RBC-ENTMCNC: 32.2 GM/DL — SIGNIFICANT CHANGE UP (ref 32–36)
MCV RBC AUTO: 80 FL — SIGNIFICANT CHANGE UP (ref 80–100)
MONOCYTES # BLD AUTO: 1.03 K/UL — HIGH (ref 0–0.9)
MONOCYTES NFR BLD AUTO: 10.8 % — SIGNIFICANT CHANGE UP (ref 2–14)
NEUTROPHILS # BLD AUTO: 7.58 K/UL — HIGH (ref 1.8–7.4)
NEUTROPHILS NFR BLD AUTO: 79.7 % — HIGH (ref 43–77)
NITRITE UR-MCNC: NEGATIVE — SIGNIFICANT CHANGE UP
NRBC # BLD: 0 /100 WBCS — SIGNIFICANT CHANGE UP (ref 0–0)
NRBC # FLD: 0 K/UL — SIGNIFICANT CHANGE UP (ref 0–0)
PH UR: 7 — SIGNIFICANT CHANGE UP (ref 5–8)
PLATELET # BLD AUTO: 271 K/UL — SIGNIFICANT CHANGE UP (ref 150–400)
POTASSIUM SERPL-MCNC: 3.8 MMOL/L — SIGNIFICANT CHANGE UP (ref 3.5–5.3)
POTASSIUM SERPL-SCNC: 3.8 MMOL/L — SIGNIFICANT CHANGE UP (ref 3.5–5.3)
PROT SERPL-MCNC: 8.1 G/DL — SIGNIFICANT CHANGE UP (ref 6–8.3)
PROT UR-MCNC: NEGATIVE MG/DL — SIGNIFICANT CHANGE UP
RAPID RVP RESULT: DETECTED
RBC # BLD: 5.05 M/UL — SIGNIFICANT CHANGE UP (ref 3.8–5.2)
RBC # FLD: 14.7 % — HIGH (ref 10.3–14.5)
RBC CASTS # UR COMP ASSIST: 4 /HPF — SIGNIFICANT CHANGE UP (ref 0–4)
RSV RNA SPEC QL NAA+PROBE: SIGNIFICANT CHANGE UP
RV+EV RNA SPEC QL NAA+PROBE: SIGNIFICANT CHANGE UP
SARS-COV-2 RNA SPEC QL NAA+PROBE: DETECTED
SODIUM SERPL-SCNC: 136 MMOL/L — SIGNIFICANT CHANGE UP (ref 135–145)
SP GR SPEC: 1.01 — SIGNIFICANT CHANGE UP (ref 1–1.03)
SQUAMOUS # UR AUTO: 2 /HPF — SIGNIFICANT CHANGE UP (ref 0–5)
UROBILINOGEN FLD QL: 0.2 MG/DL — SIGNIFICANT CHANGE UP (ref 0.2–1)
WBC # BLD: 9.52 K/UL — SIGNIFICANT CHANGE UP (ref 3.8–10.5)
WBC # FLD AUTO: 9.52 K/UL — SIGNIFICANT CHANGE UP (ref 3.8–10.5)
WBC UR QL: 0 /HPF — SIGNIFICANT CHANGE UP (ref 0–5)

## 2023-10-01 PROCEDURE — 71046 X-RAY EXAM CHEST 2 VIEWS: CPT | Mod: 26

## 2023-10-01 RX ORDER — ONDANSETRON 8 MG/1
1 TABLET, FILM COATED ORAL
Qty: 15 | Refills: 0
Start: 2023-10-01

## 2023-10-01 RX ORDER — NIRMATRELVIR AND RITONAVIR 150-100 MG
1 KIT ORAL
Qty: 1 | Refills: 0
Start: 2023-10-01 | End: 2023-10-05

## 2023-10-02 LAB
CULTURE RESULTS: NO GROWTH — SIGNIFICANT CHANGE UP
SPECIMEN SOURCE: SIGNIFICANT CHANGE UP

## 2023-10-06 LAB
CULTURE RESULTS: SIGNIFICANT CHANGE UP
CULTURE RESULTS: SIGNIFICANT CHANGE UP
SPECIMEN SOURCE: SIGNIFICANT CHANGE UP
SPECIMEN SOURCE: SIGNIFICANT CHANGE UP

## 2023-10-30 PROBLEM — E78.5 HYPERLIPIDEMIA, UNSPECIFIED: Chronic | Status: ACTIVE | Noted: 2023-10-01

## 2023-11-03 ENCOUNTER — TRANSCRIPTION ENCOUNTER (OUTPATIENT)
Age: 53
End: 2023-11-03

## 2023-11-03 ENCOUNTER — APPOINTMENT (OUTPATIENT)
Dept: ORTHOPEDIC SURGERY | Facility: CLINIC | Age: 53
End: 2023-11-03
Payer: COMMERCIAL

## 2023-11-03 VITALS — WEIGHT: 140 LBS | BODY MASS INDEX: 24.8 KG/M2 | HEIGHT: 63 IN

## 2023-11-03 DIAGNOSIS — I10 ESSENTIAL (PRIMARY) HYPERTENSION: ICD-10-CM

## 2023-11-03 DIAGNOSIS — M25.531 PAIN IN RIGHT WRIST: ICD-10-CM

## 2023-11-03 PROBLEM — Z00.00 ENCOUNTER FOR PREVENTIVE HEALTH EXAMINATION: Status: ACTIVE | Noted: 2023-11-03

## 2023-11-03 PROCEDURE — 72110 X-RAY EXAM L-2 SPINE 4/>VWS: CPT

## 2023-11-03 PROCEDURE — 99203 OFFICE O/P NEW LOW 30 MIN: CPT | Mod: 25

## 2023-11-03 PROCEDURE — 72170 X-RAY EXAM OF PELVIS: CPT

## 2023-11-03 PROCEDURE — 72050 X-RAY EXAM NECK SPINE 4/5VWS: CPT

## 2023-11-03 RX ORDER — DICLOFENAC POTASSIUM 50 MG/1
50 POWDER, FOR SOLUTION ORAL
Refills: 0 | Status: ACTIVE | COMMUNITY

## 2023-11-03 RX ORDER — NAPROXEN 500 MG/1
500 TABLET ORAL
Qty: 60 | Refills: 0 | Status: ACTIVE | COMMUNITY
Start: 2023-11-03 | End: 1900-01-01

## 2023-11-03 RX ORDER — LOSARTAN POTASSIUM 100 MG/1
TABLET, FILM COATED ORAL
Refills: 0 | Status: ACTIVE | COMMUNITY

## 2023-11-03 RX ORDER — ATORVASTATIN CALCIUM 20 MG/1
20 TABLET, FILM COATED ORAL
Refills: 0 | Status: ACTIVE | COMMUNITY

## 2023-11-03 RX ORDER — METOPROLOL TARTRATE 25 MG/1
25 TABLET, FILM COATED ORAL
Refills: 0 | Status: ACTIVE | COMMUNITY

## 2023-11-22 ENCOUNTER — APPOINTMENT (OUTPATIENT)
Dept: MRI IMAGING | Facility: CLINIC | Age: 53
End: 2023-11-22
Payer: COMMERCIAL

## 2023-11-22 PROCEDURE — 72148 MRI LUMBAR SPINE W/O DYE: CPT

## 2023-12-01 ENCOUNTER — APPOINTMENT (OUTPATIENT)
Dept: ORTHOPEDIC SURGERY | Facility: CLINIC | Age: 53
End: 2023-12-01
Payer: COMMERCIAL

## 2023-12-01 DIAGNOSIS — G56.02 CARPAL TUNNEL SYNDROME, LEFT UPPER LIMB: ICD-10-CM

## 2023-12-01 DIAGNOSIS — R29.898 OTHER SYMPTOMS AND SIGNS INVOLVING THE MUSCULOSKELETAL SYSTEM: ICD-10-CM

## 2023-12-01 DIAGNOSIS — M65.4 RADIAL STYLOID TENOSYNOVITIS [DE QUERVAIN]: ICD-10-CM

## 2023-12-01 PROCEDURE — 20550 NJX 1 TENDON SHEATH/LIGAMENT: CPT

## 2023-12-01 PROCEDURE — 99214 OFFICE O/P EST MOD 30 MIN: CPT | Mod: 25

## 2023-12-14 ENCOUNTER — APPOINTMENT (OUTPATIENT)
Dept: PAIN MANAGEMENT | Facility: CLINIC | Age: 53
End: 2023-12-14
Payer: COMMERCIAL

## 2023-12-14 VITALS — WEIGHT: 140 LBS | BODY MASS INDEX: 24.8 KG/M2 | HEIGHT: 63 IN

## 2023-12-14 DIAGNOSIS — Z86.79 PERSONAL HISTORY OF OTHER DISEASES OF THE CIRCULATORY SYSTEM: ICD-10-CM

## 2023-12-14 DIAGNOSIS — Z83.3 FAMILY HISTORY OF DIABETES MELLITUS: ICD-10-CM

## 2023-12-14 DIAGNOSIS — Z82.49 FAMILY HISTORY OF ISCHEMIC HEART DISEASE AND OTHER DISEASES OF THE CIRCULATORY SYSTEM: ICD-10-CM

## 2023-12-14 PROCEDURE — 99204 OFFICE O/P NEW MOD 45 MIN: CPT

## 2023-12-14 NOTE — DISCUSSION/SUMMARY
[de-identified] : GREGG RAO is a 53 year-old woman presenting for a NPV for a history of chronic low back pain with radicular features.  Prior treatment:  acupuncture  chiropractor naproxen prn Rest OTC NSAIDs acetaminophen prn  Plan: 1) MRI lumbar spine images reviewed with the patient. 2) Discussed RIGHT L5-S1 TFESI w/ MAC. Patient will consider this.  3) Discussed a variety of exercises and regular stretches and printed out various extension/flexion exercises and core strengthening exercises. Patient will start a daily stretching routine and eventually resume cardio workout. Also discussed the importance of stress reduction and good sleep hygiene. 4) Initiate physical therapy - script provided 5) Trial meloxicam 15mg daily prn; Discussed the risks of NSAIDs, including worsening HTN, cardiovascular risks, GI risk, renal injury. The patient was told not to take other NSAIDs with this and to consult with their PCP if there is a significant medical history to warrant this prior to initiating treatment. 6) Trial cyclobenzaprine 5mg qhs; discussed AEs 7) RTC 6 weeks

## 2023-12-14 NOTE — PHYSICAL EXAM
[de-identified] : Gen: NAD Head: NC/AT Eyes: no glasses, no scleral icterus ENT: mucous membranes moist CV: RRR, S1 S2, no mrg Lungs: CTAB, nonlabored breathing Abd: soft, NT/ND Ext: full ROM in all extremities, no peripheral edema Back: +SLR on the right, limited extension 2/2 pain; +TTP in the bilateral low lumbar facet region Neuro: CN intact LEs +5 L +5 R hip flexion +5 L +5 R leg extension +5 L +5 R leg flexion +5 L +5 R foot dorsiflexion +5 L +5 R foot plantarflexion +5 L +5 R EHL extension Psych: normal affect Skin: no visible lesions

## 2023-12-14 NOTE — HISTORY OF PRESENT ILLNESS
[Lower back] : lower back [Right Leg] : right leg [10] : 10 [Radiating] : radiating [Sharp] : sharp [Shooting] : shooting [Stabbing] : stabbing [Throbbing] : throbbing [Tingling] : tingling [Constant] : constant [Household chores] : household chores [Leisure] : leisure [Work] : work [Social interactions] : social interactions [Meds] : meds [Standing] : standing [FreeTextEntry1] : 12/14/2023: GREGG RAO is a 53 year-old woman presenting for a NPV for a history of chronic low back pain with radicular features. The patient notes having pain in the back for several years. She notes that pain has gotten worse over the past several months. At this point, the patient endorses having pain in the right low lumbosacral region with radiation to the right posterolateral thigh and lateral leg. Intermittent numbness and tingling. No focal weakness. No bowel or bladder incontinence. Pain is worse with sitting or standing for long periods.  The patient states that average pain over the past week was 8/10 in severity. Mood: Patient notes having some depression 2/2 pain.  Sleep: Patient notes that she has difficulty with sleep initiation and maintenance 2/2 pain.  [] : no [FreeTextEntry6] : sore , numbness

## 2023-12-14 NOTE — DATA REVIEWED
[MRI] : MRI [Lumbar Spine] : lumbar spine [Report was reviewed and noted in the chart] : The report was reviewed and noted in the chart [I independently reviewed and interpreted images and report] : I independently reviewed and interpreted images and report [FreeTextEntry1] : 11/22/2023: Diffuse, multi-level facet hypertrophy L5-S1: grade I anterolisthesis, broad disc bulge, synovial cyst contiguous with RIGHT facet joint, inferior NF stenosis 5mm tarlov cyst S2

## 2024-01-12 ENCOUNTER — APPOINTMENT (OUTPATIENT)
Dept: ORTHOPEDIC SURGERY | Facility: CLINIC | Age: 54
End: 2024-01-12

## 2024-01-29 ENCOUNTER — APPOINTMENT (OUTPATIENT)
Dept: PAIN MANAGEMENT | Facility: CLINIC | Age: 54
End: 2024-01-29

## 2024-01-31 NOTE — HISTORY OF PRESENT ILLNESS
[Lower back] : lower back [Right Leg] : right leg [10] : 10 [Radiating] : radiating [Sharp] : sharp [Stabbing] : stabbing [Shooting] : shooting [Throbbing] : throbbing [Tingling] : tingling [Household chores] : household chores [Constant] : constant [Leisure] : leisure [Work] : work [Social interactions] : social interactions [Meds] : meds [Standing] : standing [FreeTextEntry1] : 1/29/2024: GREGG RAO is a 53 year-old woman presenting for a RPV for a history of chronic low back pain with radicular features.    The patient states that average pain over the past week was /10 in severity. Mood: Sleep:    12/14/2023: GREGG RAO is a 53 year-old woman presenting for a NPV for a history of chronic low back pain with radicular features. The patient notes having pain in the back for several years. She notes that pain has gotten worse over the past several months. At this point, the patient endorses having pain in the right low lumbosacral region with radiation to the right posterolateral thigh and lateral leg. Intermittent numbness and tingling. No focal weakness. No bowel or bladder incontinence. Pain is worse with sitting or standing for long periods.  The patient states that average pain over the past week was 8/10 in severity. Mood: Patient notes having some depression 2/2 pain.  Sleep: Patient notes that she has difficulty with sleep initiation and maintenance 2/2 pain.  [] : no [FreeTextEntry6] : sore , numbness

## 2024-01-31 NOTE — DISCUSSION/SUMMARY
[de-identified] : GREGG RAO is a 53 year-old woman presenting for a NPV for a history of chronic low back pain with radicular features.  Prior treatment:  acupuncture  chiropractor naproxen prn Rest OTC NSAIDs acetaminophen prn  Plan: 1) MRI lumbar spine images reviewed with the patient. 2) Discussed RIGHT L5-S1 TFESI w/ MAC. Patient will consider this.  3) Discussed a variety of exercises and regular stretches and printed out various extension/flexion exercises and core strengthening exercises. Patient will start a daily stretching routine and eventually resume cardio workout. Also discussed the importance of stress reduction and good sleep hygiene. 4) Initiate physical therapy - script provided 5) Trial meloxicam 15mg daily prn; Discussed the risks of NSAIDs, including worsening HTN, cardiovascular risks, GI risk, renal injury. The patient was told not to take other NSAIDs with this and to consult with their PCP if there is a significant medical history to warrant this prior to initiating treatment. 6) Trial cyclobenzaprine 5mg qhs; discussed AEs 7) RTC 6 weeks

## 2024-01-31 NOTE — PHYSICAL EXAM
[de-identified] : Gen: NAD Head: NC/AT Eyes: no glasses, no scleral icterus ENT: mucous membranes moist CV: RRR, S1 S2, no mrg Lungs: CTAB, nonlabored breathing Abd: soft, NT/ND Ext: full ROM in all extremities, no peripheral edema Back: +SLR on the right, limited extension 2/2 pain; +TTP in the bilateral low lumbar facet region Neuro: CN intact LEs +5 L +5 R hip flexion +5 L +5 R leg extension +5 L +5 R leg flexion +5 L +5 R foot dorsiflexion +5 L +5 R foot plantarflexion +5 L +5 R EHL extension Psych: normal affect Skin: no visible lesions

## 2024-03-06 ENCOUNTER — RX RENEWAL (OUTPATIENT)
Age: 54
End: 2024-03-06

## 2024-03-06 RX ORDER — CYCLOBENZAPRINE HYDROCHLORIDE 5 MG/1
5 TABLET, FILM COATED ORAL
Qty: 30 | Refills: 0 | Status: ACTIVE | COMMUNITY
Start: 2023-12-14 | End: 1900-01-01

## 2024-03-28 PROBLEM — M62.838 CERVICAL PARASPINAL MUSCLE SPASM: Status: ACTIVE | Noted: 2023-11-03

## 2024-03-29 ENCOUNTER — APPOINTMENT (OUTPATIENT)
Dept: PAIN MANAGEMENT | Facility: CLINIC | Age: 54
End: 2024-03-29
Payer: COMMERCIAL

## 2024-03-29 VITALS — BODY MASS INDEX: 26.4 KG/M2 | HEIGHT: 63 IN | WEIGHT: 149 LBS

## 2024-03-29 DIAGNOSIS — M62.838 OTHER MUSCLE SPASM: ICD-10-CM

## 2024-03-29 PROCEDURE — 99213 OFFICE O/P EST LOW 20 MIN: CPT

## 2024-03-29 NOTE — PHYSICAL EXAM
[de-identified] : Gen: NAD Head: NC/AT Eyes: no glasses, no scleral icterus ENT: mucous membranes moist CV: RRR, S1 S2, no mrg Lungs: CTAB, nonlabored breathing Abd: soft, NT/ND Ext: full ROM in all extremities, no peripheral edema Back: +SLR bilaterally, limited extension 2/2 pain; +TTP in the bilateral low lumbar facet region Neuro: CN intact LEs +5 L +5 R hip flexion +5 L +5 R leg extension +5 L +5 R leg flexion +5 L +5 R foot dorsiflexion +5 L +5 R foot plantarflexion +5 L +5 R EHL extension Psych: normal affect Skin: no visible lesions

## 2024-03-29 NOTE — DISCUSSION/SUMMARY
[de-identified] : GREGG RAO is a 54 year-old woman presenting for a RPV for a history of chronic low back pain with radicular features.  Prior treatment:  Physical therapy x2-3 month acupuncture  chiropractor naproxen prn Rest OTC NSAIDs acetaminophen prn  Plan: 1) MRI lumbar spine images reviewed with the patient. 2) Schedule BILATERAL L5-S1 TFESI w/ MAC. The procedure was explained to the patient in detail. Reviewed risks, benefits, and alternatives with the patient. Some risks discussed included temporary increase in pain, bleeding, infection, and side effects from steroids. The patient expressed understanding and would like to proceed. 3) Continue home exercise regimen 4) Continue meloxicam 15mg daily prn; denies AEs 5) Continue cyclobenzaprine 5mg qhs; denies AEs 6) RTC post procedure

## 2024-03-29 NOTE — HISTORY OF PRESENT ILLNESS
[Lower back] : lower back [Right Leg] : right leg [10] : 10 [Radiating] : radiating [Sharp] : sharp [Shooting] : shooting [Stabbing] : stabbing [Throbbing] : throbbing [Tingling] : tingling [Constant] : constant [Household chores] : household chores [Leisure] : leisure [Work] : work [Social interactions] : social interactions [Standing] : standing [Meds] : meds [Buttock] : buttock [8] : 8 [7] : 7 [Sitting] : sitting [Walking] : walking [Exercising] : exercising [Physical therapy] : physical therapy [Full time] : Work status: full time [FreeTextEntry1] : 3/29/2024: GREGG RAO is a 54 year-old woman presenting for a RPV for a history of chronic low back pain with radicular features. The patient was prescribed meloxicam and cyclobenzaprine at last visit and was given a script for physical therapy. Unfortunately, she continues to still have significant pain across the low back. She notes having an aching, throbbing pain in the right low lumbar region with radiation to the right gluteal region and posterior thigh and posterolateral leg. She has more recently started to have the same pattern of pain in the left lower extremity as well. She notes intermittent tingling and numbness over this distribution. No focal weakness. No bowel or bladder incontinence. Pain is worse with sitting and standing. She notes that walking sometimes helps with the patient.   The patient states that average pain over the past week was 8/10 in severity. Mood: Patient denies depression or anxiety.  Sleep: Patient notes significant difficulty with sleep 2/2 pain.   12/14/2023: GREGG RAO is a 53 year-old woman presenting for a NPV for a history of chronic low back pain with radicular features. The patient notes having pain in the back for several years. She notes that pain has gotten worse over the past several months. At this point, the patient endorses having pain in the right low lumbosacral region with radiation to the right posterolateral thigh and lateral leg. Intermittent numbness and tingling. No focal weakness. No bowel or bladder incontinence. Pain is worse with sitting or standing for long periods.  The patient states that average pain over the past week was 8/10 in severity. Mood: Patient notes having some depression 2/2 pain.  Sleep: Patient notes that she has difficulty with sleep initiation and maintenance 2/2 pain.  [] : no [FreeTextEntry6] : sore , numbness  [FreeTextEntry7] : right leg  [FreeTextEntry9] : stretching

## 2024-04-24 ENCOUNTER — APPOINTMENT (OUTPATIENT)
Dept: PAIN MANAGEMENT | Facility: CLINIC | Age: 54
End: 2024-04-24
Payer: COMMERCIAL

## 2024-04-24 DIAGNOSIS — M54.50 LOW BACK PAIN, UNSPECIFIED: ICD-10-CM

## 2024-04-24 PROCEDURE — 64483 NJX AA&/STRD TFRM EPI L/S 1: CPT | Mod: 50

## 2024-04-24 NOTE — PROCEDURE
[FreeTextEntry1] : BILATERAL L5-S1 transforaminal epidural steroid injection [FreeTextEntry2] : chronic lumbar radiculopathy [FreeTextEntry3] : Procedure Date: 04/24/2024   Preoperative Diagnosis: chronic low back pain with bilateral sciatica   Procedure: BILATERAL L5-S1 transforaminal epidural steroid injection under fluoroscopic guidance   Anesthesia: MAC   Complications: none   EBL: none   Procedure in detail: Patient was seen and examined. Risks, benefits, and alternatives for the procedure were discussed with the patient in detail. The patient expressed understanding, gave written and verbal consent, and placed themselves in a prone position on the procedure table. Skin overlying the lumbosacral spine was prepped with chloraprep and draped in the usual sterile fashion. Fluoroscopic images were obtained to identify the L5 vertebral body. Target was the 6 o'clock position under the right and left pedicle at the L5 vertebral body. Skin overlying the targets was marked and infiltrated with 1% lidocaine. Using two 22 gauge, 3.5 inch spinal needles, these were inserted and advanced under intermittent fluoroscopic guidance. When felt to be engaged in the epidural space, lateral view was used to confirm depth. After negative aspiration for heme/csf, contrast was injected under live fluoroscopy through each needle, both of which showed contrast spread consistent with epidural flow. No evidence of intravascular or intrathecal uptake. At this point, a total of 2ml of injectate, which consisted of 1ml of 6mg/ml betamethasone and 1ml of normal saline was injected through each needle. The needles were restyletted and withdrawn, a band aid was placed over the injection site. Patient tolerated the procedure well. The patient recovered uneventfully and was discharged home in stable condition.

## 2024-05-09 PROBLEM — M54.16 RIGHT LUMBAR RADICULOPATHY: Status: ACTIVE | Noted: 2023-11-03

## 2024-05-09 PROBLEM — M51.16 LUMBAR DISC DISEASE WITH RADICULOPATHY: Status: ACTIVE | Noted: 2023-12-14

## 2024-05-10 ENCOUNTER — APPOINTMENT (OUTPATIENT)
Dept: PAIN MANAGEMENT | Facility: CLINIC | Age: 54
End: 2024-05-10
Payer: COMMERCIAL

## 2024-05-10 VITALS — WEIGHT: 151 LBS | HEIGHT: 63 IN | BODY MASS INDEX: 26.75 KG/M2

## 2024-05-10 DIAGNOSIS — M51.16 INTERVERTEBRAL DISC DISORDERS WITH RADICULOPATHY, LUMBAR REGION: ICD-10-CM

## 2024-05-10 DIAGNOSIS — M54.16 RADICULOPATHY, LUMBAR REGION: ICD-10-CM

## 2024-05-10 PROCEDURE — 99213 OFFICE O/P EST LOW 20 MIN: CPT

## 2024-05-10 RX ORDER — NAPROXEN 500 MG/1
500 TABLET ORAL
Qty: 60 | Refills: 1 | Status: ACTIVE | COMMUNITY
Start: 2024-05-10 | End: 1900-01-01

## 2024-05-10 RX ORDER — GABAPENTIN 300 MG/1
300 CAPSULE ORAL
Qty: 60 | Refills: 1 | Status: ACTIVE | COMMUNITY
Start: 2024-05-10 | End: 1900-01-01

## 2024-05-10 RX ORDER — MELOXICAM 15 MG/1
15 TABLET ORAL
Qty: 30 | Refills: 0 | Status: DISCONTINUED | COMMUNITY
Start: 2023-12-14 | End: 2024-05-10

## 2024-05-10 NOTE — HISTORY OF PRESENT ILLNESS
[Lower back] : lower back [Buttock] : buttock [Right Leg] : right leg [8] : 8 [Radiating] : radiating [Sharp] : sharp [Shooting] : shooting [Stabbing] : stabbing [Throbbing] : throbbing [Tingling] : tingling [Constant] : constant [Household chores] : household chores [Leisure] : leisure [Work] : work [Social interactions] : social interactions [Meds] : meds [Sitting] : sitting [Standing] : standing [Walking] : walking [Exercising] : exercising [Physical therapy] : physical therapy [Full time] : Work status: full time [7] : 7 [5] : 5 [FreeTextEntry1] : 5/10/2024: GREGG RAO is a 54 year-old woman presenting for a RPV for a history of chronic low back pain with radicular features. Patient underwent a BILATERAL L5-S1 TFESI w/ MAC on 4/24/2024. She notes having 100% relief of pain in the left low back and lower extremity. However, she still has pain in the right low back and gluteal region with tingling and numbness. The patient states that average pain over the past week was 6/10 in severity. Mood: Patient denies depression or anxiety.  Sleep: Patient notes significant difficulty with sleep 2/2 pain.   3/29/2024: GREGG RAO is a 54 year-old woman presenting for a RPV for a history of chronic low back pain with radicular features. The patient was prescribed meloxicam and cyclobenzaprine at last visit and was given a script for physical therapy. Unfortunately, she continues to still have significant pain across the low back. She notes having an aching, throbbing pain in the right low lumbar region with radiation to the right gluteal region and posterior thigh and posterolateral leg. She has more recently started to have the same pattern of pain in the left lower extremity as well. She notes intermittent tingling and numbness over this distribution. No focal weakness. No bowel or bladder incontinence. Pain is worse with sitting and standing. She notes that walking sometimes helps with the patient.   The patient states that average pain over the past week was 8/10 in severity. Mood: Patient denies depression or anxiety.  Sleep: Patient notes significant difficulty with sleep 2/2 pain.   12/14/2023: GREGG RAO is a 53 year-old woman presenting for a NPV for a history of chronic low back pain with radicular features. The patient notes having pain in the back for several years. She notes that pain has gotten worse over the past several months. At this point, the patient endorses having pain in the right low lumbosacral region with radiation to the right posterolateral thigh and lateral leg. Intermittent numbness and tingling. No focal weakness. No bowel or bladder incontinence. Pain is worse with sitting or standing for long periods.  The patient states that average pain over the past week was 8/10 in severity. Mood: Patient notes having some depression 2/2 pain.  Sleep: Patient notes that she has difficulty with sleep initiation and maintenance 2/2 pain.  [] : no [FreeTextEntry6] : sore , numbness  [FreeTextEntry7] : right leg  [FreeTextEntry9] : stretching

## 2024-05-10 NOTE — PHYSICAL EXAM
[de-identified] : Gen: NAD Head: NC/AT Eyes: no glasses, no scleral icterus ENT: mucous membranes moist CV: RRR, S1 S2, no mrg Lungs: CTAB, nonlabored breathing Abd: soft, NT/ND Ext: full ROM in all extremities, no peripheral edema Back: +SLR bilaterally, limited extension 2/2 pain; +TTP in the bilateral low lumbar facet region Neuro: CN intact LEs +5 L +5 R hip flexion +5 L +5 R leg extension +5 L +5 R leg flexion +5 L +5 R foot dorsiflexion +5 L +5 R foot plantarflexion +5 L +5 R EHL extension Psych: normal affect Skin: no visible lesions

## 2024-05-10 NOTE — DISCUSSION/SUMMARY
[de-identified] : GREGG RAO is a 54 year-old woman presenting for a RPV for a history of chronic low back pain with radicular features.  Prior treatment:  4/24/2024: BILATERAL L5-S1 TFESI w/ MAC w/ 60% improvement of pain and function Physical therapy x2-3 month acupuncture  chiropractor naproxen prn Rest OTC NSAIDs acetaminophen prn  Plan: 1) MRI lumbar spine images reviewed with the patient. 2) Consider repeat BILATERAL L5-S1 TFESI w/ MAC in the future vs L5-S1 ILESI 3) Trial gabapentin 300mg qhs with increase to BID; Discussed AEs, including sedation, dizziness, somnolence, depression. Patient told to use caution when driving or working after taking the first few doses. 4) Continue home exercise regimen 5) Trial naproxen 500mg prn; Discussed the risks of NSAIDs, including worsening HTN, cardiovascular risks, GI risk, renal injury. The patient was told not to take other NSAIDs with this and to consult with their PCP if there is a significant medical history to warrant this prior to initiating treatment. 6) Continue cyclobenzaprine 5mg qhs; denies AEs 7) RTC 2 months

## 2024-07-12 ENCOUNTER — APPOINTMENT (OUTPATIENT)
Dept: PAIN MANAGEMENT | Facility: CLINIC | Age: 54
End: 2024-07-12

## 2024-07-12 VITALS — BODY MASS INDEX: 26.93 KG/M2 | WEIGHT: 152 LBS | HEIGHT: 63 IN

## 2024-07-12 DIAGNOSIS — M62.838 OTHER MUSCLE SPASM: ICD-10-CM

## 2024-07-12 DIAGNOSIS — M51.16 INTERVERTEBRAL DISC DISORDERS WITH RADICULOPATHY, LUMBAR REGION: ICD-10-CM

## 2024-07-12 DIAGNOSIS — M54.16 RADICULOPATHY, LUMBAR REGION: ICD-10-CM

## 2024-07-12 PROCEDURE — 99213 OFFICE O/P EST LOW 20 MIN: CPT

## 2024-09-20 ENCOUNTER — APPOINTMENT (OUTPATIENT)
Dept: PAIN MANAGEMENT | Facility: CLINIC | Age: 54
End: 2024-09-20

## 2024-09-25 NOTE — PHYSICAL EXAM
[de-identified] : Gen: NAD Head: NC/AT Eyes: no glasses, no scleral icterus ENT: mucous membranes moist CV: RRR, S1 S2, no mrg Lungs: CTAB, nonlabored breathing Abd: soft, NT/ND Ext: full ROM in all extremities, no peripheral edema Back: +SLR bilaterally, limited extension 2/2 pain; +TTP in the bilateral low lumbar facet region Neuro: CN intact LEs +5 L +5 R hip flexion +5 L +5 R leg extension +5 L +5 R leg flexion +5 L +5 R foot dorsiflexion +5 L +5 R foot plantarflexion +5 L +5 R EHL extension Psych: normal affect Skin: no visible lesions

## 2024-09-25 NOTE — HISTORY OF PRESENT ILLNESS
[Lower back] : lower back [Buttock] : buttock [Right Leg] : right leg [8] : 8 [5] : 5 [Radiating] : radiating [Sharp] : sharp [Shooting] : shooting [Stabbing] : stabbing [Throbbing] : throbbing [Tingling] : tingling [Constant] : constant [Household chores] : household chores [Leisure] : leisure [Work] : work [Social interactions] : social interactions [Meds] : meds [Sitting] : sitting [Standing] : standing [Walking] : walking [Exercising] : exercising [Physical therapy] : physical therapy [Full time] : Work status: full time [FreeTextEntry1] : 09/20/2024 GREGG RAO is a 54 year-old woman presenting for a RPV for a history of chronic low back pain with radicular features.     The patient states that average pain over the past week was /10 in severity.   Mood: Sleep:  7/12/2024 GREGG RAO is a 54 year-old woman presenting for a RPV for a history of chronic low back pain with radicular features. Has improvement from naproxen and gabapentin.  Is under a w/u for her heart.   The patient states that average pain over the past week was 6/10 in severity. Mood: Patient denies depression or anxiety.  Sleep: Patient notes significant difficulty with sleep 2/2 pain.   5/10/2024: GREGG RAO is a 54 year-old woman presenting for a RPV for a history of chronic low back pain with radicular features. Patient underwent a BILATERAL L5-S1 TFESI w/ MAC on 4/24/2024. She notes having 100% relief of pain in the left low back and lower extremity. However, she still has pain in the right low back and gluteal region with tingling and numbness. The patient states that average pain over the past week was 6/10 in severity. Mood: Patient denies depression or anxiety.  Sleep: Patient notes significant difficulty with sleep 2/2 pain.   3/29/2024: GREGG RAO is a 54 year-old woman presenting for a RPV for a history of chronic low back pain with radicular features. The patient was prescribed meloxicam and cyclobenzaprine at last visit and was given a script for physical therapy. Unfortunately, she continues to still have significant pain across the low back. She notes having an aching, throbbing pain in the right low lumbar region with radiation to the right gluteal region and posterior thigh and posterolateral leg. She has more recently started to have the same pattern of pain in the left lower extremity as well. She notes intermittent tingling and numbness over this distribution. No focal weakness. No bowel or bladder incontinence. Pain is worse with sitting and standing. She notes that walking sometimes helps with the patient.   The patient states that average pain over the past week was 8/10 in severity. Mood: Patient denies depression or anxiety.  Sleep: Patient notes significant difficulty with sleep 2/2 pain.   12/14/2023: GREGG RAO is a 53 year-old woman presenting for a NPV for a history of chronic low back pain with radicular features. The patient notes having pain in the back for several years. She notes that pain has gotten worse over the past several months. At this point, the patient endorses having pain in the right low lumbosacral region with radiation to the right posterolateral thigh and lateral leg. Intermittent numbness and tingling. No focal weakness. No bowel or bladder incontinence. Pain is worse with sitting or standing for long periods.  The patient states that average pain over the past week was 8/10 in severity. Mood: Patient notes having some depression 2/2 pain.  Sleep: Patient notes that she has difficulty with sleep initiation and maintenance 2/2 pain.  [] : no [FreeTextEntry6] : sore , numbness  [FreeTextEntry7] : right leg  [FreeTextEntry9] : stretching

## 2024-10-19 ENCOUNTER — APPOINTMENT (OUTPATIENT)
Dept: PAIN MANAGEMENT | Facility: CLINIC | Age: 54
End: 2024-10-19
Payer: COMMERCIAL

## 2024-10-19 VITALS — HEIGHT: 63 IN | WEIGHT: 150 LBS | BODY MASS INDEX: 26.58 KG/M2

## 2024-10-19 PROCEDURE — 99213 OFFICE O/P EST LOW 20 MIN: CPT

## 2024-10-25 ENCOUNTER — APPOINTMENT (OUTPATIENT)
Dept: ORTHOPEDIC SURGERY | Facility: CLINIC | Age: 54
End: 2024-10-25
Payer: COMMERCIAL

## 2024-10-25 VITALS — WEIGHT: 150 LBS | BODY MASS INDEX: 26.58 KG/M2 | HEIGHT: 63 IN

## 2024-10-25 DIAGNOSIS — M65.332 TRIGGER FINGER, LEFT MIDDLE FINGER: ICD-10-CM

## 2024-10-25 PROCEDURE — J3490M: CUSTOM

## 2024-10-25 PROCEDURE — 20550 NJX 1 TENDON SHEATH/LIGAMENT: CPT | Mod: F2

## 2024-10-25 PROCEDURE — 99203 OFFICE O/P NEW LOW 30 MIN: CPT | Mod: 25

## 2024-11-13 ENCOUNTER — APPOINTMENT (OUTPATIENT)
Dept: PAIN MANAGEMENT | Facility: CLINIC | Age: 54
End: 2024-11-13

## 2024-11-27 ENCOUNTER — APPOINTMENT (OUTPATIENT)
Dept: PAIN MANAGEMENT | Facility: CLINIC | Age: 54
End: 2024-11-27

## 2025-01-24 NOTE — ED ADULT NURSE NOTE - PAIN: BODY LOCATION
What Type Of Note Output Would You Prefer (Optional)?: Bullet Format chest, general Has Your Skin Lesion Been Treated?: not been treated Is This A New Presentation, Or A Follow-Up?: Skin Lesion

## 2025-04-04 DIAGNOSIS — Z13.83 ENCOUNTER FOR SCREENING FOR RESPIRATORY DISORDER NEC: ICD-10-CM

## 2025-04-11 ENCOUNTER — APPOINTMENT (OUTPATIENT)
Dept: PULMONOLOGY | Facility: CLINIC | Age: 55
End: 2025-04-11
Payer: COMMERCIAL

## 2025-04-11 VITALS
DIASTOLIC BLOOD PRESSURE: 88 MMHG | BODY MASS INDEX: 25.87 KG/M2 | WEIGHT: 146 LBS | OXYGEN SATURATION: 98 % | SYSTOLIC BLOOD PRESSURE: 157 MMHG | HEART RATE: 88 BPM | HEIGHT: 63 IN

## 2025-04-11 DIAGNOSIS — R07.9 CHEST PAIN, UNSPECIFIED: ICD-10-CM

## 2025-04-11 DIAGNOSIS — R06.09 OTHER FORMS OF DYSPNEA: ICD-10-CM

## 2025-04-11 DIAGNOSIS — J84.10 PULMONARY FIBROSIS, UNSPECIFIED: ICD-10-CM

## 2025-04-11 PROCEDURE — 94726 PLETHYSMOGRAPHY LUNG VOLUMES: CPT

## 2025-04-11 PROCEDURE — ZZZZZ: CPT

## 2025-04-11 PROCEDURE — 99203 OFFICE O/P NEW LOW 30 MIN: CPT | Mod: 25

## 2025-04-11 PROCEDURE — 94060 EVALUATION OF WHEEZING: CPT

## 2025-04-11 PROCEDURE — 94729 DIFFUSING CAPACITY: CPT

## 2025-04-24 DIAGNOSIS — R05.9 COUGH, UNSPECIFIED: ICD-10-CM

## 2025-05-16 ENCOUNTER — OUTPATIENT (OUTPATIENT)
Dept: OUTPATIENT SERVICES | Facility: HOSPITAL | Age: 55
LOS: 1 days | End: 2025-05-16
Payer: COMMERCIAL

## 2025-05-16 ENCOUNTER — APPOINTMENT (OUTPATIENT)
Dept: RADIOLOGY | Facility: IMAGING CENTER | Age: 55
End: 2025-05-16

## 2025-05-16 ENCOUNTER — APPOINTMENT (OUTPATIENT)
Dept: CT IMAGING | Facility: IMAGING CENTER | Age: 55
End: 2025-05-16

## 2025-05-16 ENCOUNTER — APPOINTMENT (OUTPATIENT)
Dept: RADIOLOGY | Facility: IMAGING CENTER | Age: 55
End: 2025-05-16
Payer: COMMERCIAL

## 2025-05-16 DIAGNOSIS — J84.10 PULMONARY FIBROSIS, UNSPECIFIED: ICD-10-CM

## 2025-05-16 PROCEDURE — 71046 X-RAY EXAM CHEST 2 VIEWS: CPT

## 2025-05-16 PROCEDURE — 71046 X-RAY EXAM CHEST 2 VIEWS: CPT | Mod: 26

## 2025-05-20 ENCOUNTER — NON-APPOINTMENT (OUTPATIENT)
Age: 55
End: 2025-05-20